# Patient Record
Sex: FEMALE | Race: ASIAN
[De-identification: names, ages, dates, MRNs, and addresses within clinical notes are randomized per-mention and may not be internally consistent; named-entity substitution may affect disease eponyms.]

---

## 2017-02-13 PROBLEM — E55.9 VITAMIN D DEFICIENCY: Status: ACTIVE | Noted: 2017-02-13

## 2017-02-13 PROBLEM — E11.9 TYPE 2 DIABETES MELLITUS WITHOUT COMPLICATION, WITHOUT LONG-TERM CURRENT USE OF INSULIN (HCC): Status: ACTIVE | Noted: 2017-02-13

## 2017-02-13 PROBLEM — M81.0 OSTEOPOROSIS: Status: ACTIVE | Noted: 2017-02-13

## 2017-02-13 PROCEDURE — 82043 UR ALBUMIN QUANTITATIVE: CPT | Performed by: INTERNAL MEDICINE

## 2017-02-13 PROCEDURE — 82570 ASSAY OF URINE CREATININE: CPT | Performed by: INTERNAL MEDICINE

## 2017-02-28 PROBLEM — E11.36 DIABETIC CATARACT (HCC): Status: ACTIVE | Noted: 2017-02-28

## 2017-02-28 PROBLEM — E11.9 TYPE 2 DIABETES MELLITUS WITHOUT COMPLICATION, WITHOUT LONG-TERM CURRENT USE OF INSULIN (HCC): Status: RESOLVED | Noted: 2017-02-13 | Resolved: 2017-02-28

## 2017-07-03 PROCEDURE — 81003 URINALYSIS AUTO W/O SCOPE: CPT | Performed by: INTERNAL MEDICINE

## 2018-03-31 PROBLEM — E11.36 DIABETIC CATARACT (HCC): Status: RESOLVED | Noted: 2017-02-28 | Resolved: 2018-03-31

## 2018-04-04 PROCEDURE — 82043 UR ALBUMIN QUANTITATIVE: CPT | Performed by: INTERNAL MEDICINE

## 2018-04-04 PROCEDURE — 82607 VITAMIN B-12: CPT | Performed by: INTERNAL MEDICINE

## 2018-04-04 PROCEDURE — 82570 ASSAY OF URINE CREATININE: CPT | Performed by: INTERNAL MEDICINE

## 2018-04-04 PROCEDURE — 81003 URINALYSIS AUTO W/O SCOPE: CPT | Performed by: INTERNAL MEDICINE

## 2018-04-04 PROCEDURE — 82746 ASSAY OF FOLIC ACID SERUM: CPT | Performed by: INTERNAL MEDICINE

## 2018-04-18 PROBLEM — M25.511 ACUTE PAIN OF RIGHT SHOULDER: Status: ACTIVE | Noted: 2018-04-18

## 2018-08-14 PROBLEM — R06.83 SNORING: Status: ACTIVE | Noted: 2018-08-14

## 2018-08-31 PROBLEM — G89.29 CHRONIC RIGHT SHOULDER PAIN: Status: ACTIVE | Noted: 2018-08-31

## 2018-08-31 PROBLEM — M25.511 CHRONIC RIGHT SHOULDER PAIN: Status: ACTIVE | Noted: 2018-08-31

## 2018-08-31 PROBLEM — G89.29 CHRONIC BILATERAL THORACIC BACK PAIN: Status: ACTIVE | Noted: 2018-08-31

## 2018-08-31 PROBLEM — M54.6 CHRONIC BILATERAL THORACIC BACK PAIN: Status: ACTIVE | Noted: 2018-08-31

## 2018-10-03 PROBLEM — I70.0 AORTIC ATHEROSCLEROSIS (HCC): Status: ACTIVE | Noted: 2018-10-03

## 2018-10-05 PROCEDURE — 84402 ASSAY OF FREE TESTOSTERONE: CPT | Performed by: INTERNAL MEDICINE

## 2018-10-05 PROCEDURE — 82043 UR ALBUMIN QUANTITATIVE: CPT | Performed by: INTERNAL MEDICINE

## 2018-10-05 PROCEDURE — 86376 MICROSOMAL ANTIBODY EACH: CPT | Performed by: INTERNAL MEDICINE

## 2018-10-05 PROCEDURE — 82679 ASSAY OF ESTRONE: CPT | Performed by: INTERNAL MEDICINE

## 2018-10-05 PROCEDURE — 84482 T3 REVERSE: CPT | Performed by: INTERNAL MEDICINE

## 2018-10-05 PROCEDURE — 82570 ASSAY OF URINE CREATININE: CPT | Performed by: INTERNAL MEDICINE

## 2018-10-05 PROCEDURE — 83090 ASSAY OF HOMOCYSTEINE: CPT | Performed by: INTERNAL MEDICINE

## 2018-10-05 PROCEDURE — 87086 URINE CULTURE/COLONY COUNT: CPT | Performed by: INTERNAL MEDICINE

## 2018-10-05 PROCEDURE — 81001 URINALYSIS AUTO W/SCOPE: CPT | Performed by: INTERNAL MEDICINE

## 2018-10-05 PROCEDURE — 84403 ASSAY OF TOTAL TESTOSTERONE: CPT | Performed by: INTERNAL MEDICINE

## 2019-03-11 PROBLEM — E11.319 TYPE 2 DIABETES MELLITUS WITH RETINOPATHY, WITHOUT LONG-TERM CURRENT USE OF INSULIN, MACULAR EDEMA PRESENCE UNSPECIFIED, UNSPECIFIED LATERALITY, UNSPECIFIED RETINOPATHY SEVERITY (HCC): Status: ACTIVE | Noted: 2017-02-13

## 2019-10-16 ENCOUNTER — HOSPITAL (OUTPATIENT)
Dept: OTHER | Age: 74
End: 2019-10-16

## 2019-10-16 ENCOUNTER — DIAGNOSTIC TRANS (OUTPATIENT)
Dept: OTHER | Age: 74
End: 2019-10-16

## 2019-10-16 LAB
ALBUMIN SERPL-MCNC: 3.8 G/DL (ref 3.6–5.1)
ALBUMIN/GLOB SERPL: 0.9 {RATIO} (ref 1–2.4)
ALP SERPL-CCNC: 67 UNITS/L (ref 45–117)
ALT SERPL-CCNC: 27 UNITS/L
ANALYZER ANC (IANC): NORMAL
ANION GAP SERPL CALC-SCNC: 9 MMOL/L (ref 10–20)
AST SERPL-CCNC: 26 UNITS/L
AST SERPL-CCNC: ABNORMAL U/L
BASOPHILS # BLD: 0.1 K/MCL (ref 0–0.3)
BASOPHILS NFR BLD: 1 %
BILIRUB SERPL-MCNC: 0.3 MG/DL (ref 0.2–1)
BUN SERPL-MCNC: 16 MG/DL (ref 6–20)
BUN/CREAT SERPL: 27 (ref 7–25)
CALCIUM SERPL-MCNC: 8.9 MG/DL (ref 8.4–10.2)
CHLORIDE SERPL-SCNC: 107 MMOL/L (ref 98–107)
CK SERPL-CCNC: 101 UNITS/L (ref 26–192)
CK SERPL-CCNC: NORMAL U/L
CO2 SERPL-SCNC: 24 MMOL/L (ref 21–32)
CREAT SERPL-MCNC: 0.59 MG/DL (ref 0.51–0.95)
DIFFERENTIAL METHOD BLD: NORMAL
EOSINOPHIL # BLD: 0.1 K/MCL (ref 0.1–0.5)
EOSINOPHIL NFR BLD: 1 %
ERYTHROCYTE [DISTWIDTH] IN BLOOD: 12.7 % (ref 11–15)
GLOBULIN SER-MCNC: 4.4 G/DL (ref 2–4)
GLUCOSE SERPL-MCNC: 161 MG/DL (ref 65–99)
HCT VFR BLD CALC: 38.6 % (ref 36–46.5)
HGB BLD-MCNC: 12.8 G/DL (ref 12–15.5)
IMM GRANULOCYTES # BLD AUTO: 0 K/MCL (ref 0–0.2)
IMM GRANULOCYTES NFR BLD: 1 %
LIPASE SERPL-CCNC: 146 UNITS/L (ref 73–393)
LYMPHOCYTES # BLD: 2.3 K/MCL (ref 1–4)
LYMPHOCYTES NFR BLD: 27 %
MCH RBC QN AUTO: 28.1 PG (ref 26–34)
MCHC RBC AUTO-ENTMCNC: 33.2 G/DL (ref 32–36.5)
MCV RBC AUTO: 84.6 FL (ref 78–100)
MONOCYTES # BLD: 0.6 K/MCL (ref 0.3–0.9)
MONOCYTES NFR BLD: 7 %
NEUTROPHILS # BLD: 5.2 K/MCL (ref 1.8–7.7)
NEUTROPHILS NFR BLD: 63 %
NEUTS SEG NFR BLD: NORMAL %
NRBC (NRBCRE): 0 /100 WBC
PLATELET # BLD: 305 K/MCL (ref 140–450)
POTASSIUM SERPL-SCNC: 4.1 MMOL/L (ref 3.4–5.1)
POTASSIUM SERPL-SCNC: ABNORMAL MMOL/L
PROT SERPL-MCNC: 8.2 G/DL (ref 6.4–8.2)
RBC # BLD: 4.56 MIL/MCL (ref 4–5.2)
SODIUM SERPL-SCNC: 136 MMOL/L (ref 135–145)
TROPONIN I SERPL HS-MCNC: <0.02 NG/ML
WBC # BLD: 8.3 K/MCL (ref 4.2–11)

## 2020-09-21 PROBLEM — G89.29 CHRONIC RIGHT SHOULDER PAIN: Status: RESOLVED | Noted: 2018-08-31 | Resolved: 2020-09-21

## 2020-09-21 PROBLEM — R06.83 SNORING: Status: RESOLVED | Noted: 2018-08-14 | Resolved: 2020-09-21

## 2020-09-21 PROBLEM — M25.511 CHRONIC RIGHT SHOULDER PAIN: Status: RESOLVED | Noted: 2018-08-31 | Resolved: 2020-09-21

## 2020-09-21 PROBLEM — G89.29 CHRONIC BILATERAL THORACIC BACK PAIN: Status: RESOLVED | Noted: 2018-08-31 | Resolved: 2020-09-21

## 2020-09-21 PROBLEM — M54.6 CHRONIC BILATERAL THORACIC BACK PAIN: Status: RESOLVED | Noted: 2018-08-31 | Resolved: 2020-09-21

## 2021-01-21 PROBLEM — E11.9 DM TYPE 2 WITHOUT RETINOPATHY (HCC): Status: ACTIVE | Noted: 2021-01-21

## 2021-01-21 PROBLEM — E11.319 TYPE 2 DIABETES MELLITUS WITH RETINOPATHY, WITHOUT LONG-TERM CURRENT USE OF INSULIN, MACULAR EDEMA PRESENCE UNSPECIFIED, UNSPECIFIED LATERALITY, UNSPECIFIED RETINOPATHY SEVERITY (HCC): Status: RESOLVED | Noted: 2017-02-13 | Resolved: 2021-01-21

## 2021-01-25 PROBLEM — M54.6 PAIN IN THORACIC SPINE: Status: ACTIVE | Noted: 2018-08-31

## 2021-01-28 PROBLEM — G89.29 CHRONIC BILATERAL THORACIC BACK PAIN: Status: ACTIVE | Noted: 2021-01-28

## 2021-01-28 PROBLEM — M54.6 CHRONIC BILATERAL THORACIC BACK PAIN: Status: ACTIVE | Noted: 2021-01-28

## 2021-03-15 DIAGNOSIS — Z23 NEED FOR VACCINATION: ICD-10-CM

## 2021-06-08 PROBLEM — E11.51 TYPE 2 DIABETES MELLITUS WITH DIABETIC PERIPHERAL ANGIOPATHY WITHOUT GANGRENE, WITHOUT LONG-TERM CURRENT USE OF INSULIN (HCC): Status: ACTIVE | Noted: 2021-06-08

## 2022-03-01 PROBLEM — D32.9 MENINGIOMA (HCC): Status: ACTIVE | Noted: 2022-03-01

## 2024-04-29 RX ORDER — IBUPROFEN 400 MG/1
1 TABLET ORAL 2 TIMES DAILY PRN
COMMUNITY
Start: 2023-08-08

## 2024-04-29 RX ORDER — ATORVASTATIN CALCIUM 40 MG/1
40 TABLET, FILM COATED ORAL DAILY
COMMUNITY
Start: 2024-04-23

## 2024-04-29 RX ORDER — NYSTATIN 100000 [USP'U]/G
1 POWDER TOPICAL 2 TIMES DAILY
COMMUNITY
Start: 2024-04-03

## 2024-04-29 RX ORDER — CARVEDILOL 3.12 MG/1
3.12 TABLET ORAL 2 TIMES DAILY
COMMUNITY
Start: 2024-04-03

## 2024-04-30 NOTE — H&P
Eliseo Swartz is a 78 year old female.   Patient presents for ventral hernia repair     HPI:       The patient presents for evaluation of abdominal hernia.   Signs and symptoms were first noticed over 2 years ago.  Symptoms include bulge and pain especially 10 days ago. Now soreness  Aggravating factors: none known - does not lift due to back pain     Associated symptoms: no change in bowel habits.      Prior treatment has included no prior abdominal surgeries x for tubal ligation     Prior imaging: CT abdomen/pelvis 3/27/2024: Moderate fat-containing ventral abdominal wall hernia. There is moderate fat stranding in the neck, which raises the possibility of incarceration. Notably, a segment of transverse colon closely abuts the neck of the hernia, but is not clearly involved at this time. No bowel obstruction.         Allergies:  No Known Allergies   Current Meds:         Current Outpatient Medications   Medication Sig Dispense Refill    carvedilol (COREG) 3.125 MG Oral Tab Take 1 tablet (3.125 mg total) by mouth 2 (two) times daily. 180 tablet 0    empagliflozin (JARDIANCE) 10 MG Oral Tab Take 1 tablet (10 mg total) by mouth daily. 90 tablet 1    fluticasone propionate 50 MCG/ACT Nasal Suspension 2 sprays by Nasal route daily. 48 g 2    fluconazole 150 MG Oral Tab Take one tablet by mouth once and repeat 72 hours later (Patient not taking: Reported on 2/20/2024) 2 tablet 0    ketoconazole 2 % External Cream Apply under breasts twice daily 45 g 3    ketoconazole 2 % External Cream Apply to affected area under the breasts twice daily 15 g 3    baclofen 5 MG Oral Tab Take 1 tablet (5 mg total) by mouth 2 (two) times daily as needed. 180 tablet 0    gabapentin 100 MG Oral Cap Take 1 capsule (100 mg total) by mouth daily as needed. 90 capsule 3    Meloxicam 7.5 MG Oral Tab Take 1 tablet (7.5 mg total) by mouth daily. 90 tablet 3    traZODone 50 MG Oral Tab Take 1 tablet (50 mg total) by mouth nightly as needed  for Sleep. 30 tablet 1    Blood Glucose Monitoring Suppl (ACCU-CHEK GUIDE) w/Device Does not apply Kit TEST BLOOD GLUCOSE EVERY DAY 1 kit 0    Accu-Chek Softclix Lancets Does not apply Misc TEST BLOOD GLUCOSE EVERY  each 3    Glucose Blood (ACCU-CHEK GUIDE) In Vitro Strip TEST BLOOD GLUCOSE EVERY  strip 3    Alcohol Swabs (BD SWAB SINGLE USE REGULAR) Does not apply Pads TEST BLOOD GLUCOSE EVERY  each 3    ibuprofen (IBU) 400 MG Oral Tab Take 1 tablet (400 mg total) by mouth 2 (two) times daily as needed for Pain. 90 tablet 0    omeprazole 20 MG Oral Capsule Delayed Release Take 1 capsule (20 mg total) by mouth every morning. Before meal 90 capsule 3    atorvastatin 10 MG Oral Tab Take 1 tablet (10 mg total) by mouth daily. 90 tablet 3    amLODIPine 2.5 MG Oral Tab Take 1 tablet (2.5 mg total) by mouth 2 (two) times daily. 180 tablet 3    levothyroxine 75 MCG Oral Tab Take 1 tablet (75 mcg total) by mouth daily. 90 tablet 3    metFORMIN 500 MG Oral Tab Take 1 tablet (500 mg total) by mouth daily. 90 tablet 3    losartan 50 MG Oral Tab Take 1 tablet (50 mg total) by mouth 2 (two) times daily. 180 tablet 3    ACCU-CHEK ZARINA PLUS In Vitro Strip TEST BLOOD GLUCOSE EVERY  strip 1    Alcohol Swabs (BD SWAB SINGLE USE REGULAR) Does not apply Pads Test daily 100 each 3    Blood Glucose Monitoring Suppl (ACCU-CHEK ZARINA PLUS) w/Device Does not apply Kit          Accu-Chek FastClix Lancets Does not apply Misc Check sugars daily 100 each 3    Blood Glucose Monitoring Suppl (ACCU-CHEK ZARINA) Does not apply Device 1 Device by Does not apply route daily. 1 Device 0    Melatonin 2.5 MG Oral Cap Take by mouth.        Cholecalciferol (VITAMIN D3) 5000 units Oral Tab Take 1 tablet by mouth as needed.        multivitamin Oral Tab Take 1 tablet by mouth daily.             HISTORY:       Past Medical History:   Diagnosis Date    Borderline diabetes       Dx around age 60s    Chronic bilateral thoracic back  pain 2018     Resolved last addressed  10/17/19    Chronic right shoulder pain 2018     Resolved last addressed  18    DJD (degenerative joint disease), thoracic 10/05/2019     CT spine mod djd, osteopenia, ? Lesion at T10    Enlarged thoracic aorta (HCC)      GERD (gastroesophageal reflux disease)      Hernia of abdominal wall      Hyperlipidemia LDL goal <100      Hypertension, essential, benign      Hypothyroidism       Dx at age 50s    Osteoporosis      Pancreatic pseudocyst       us last year, mri at [facility]    Pituitary abnormality (HCC)       Pt says she was told she had pituitary abnormality ?empty sella?    Rhinitis 2014     Resolved last addressed  14    Snoring 2018     Resolved last addressed  18            Past Surgical History:   Procedure Laterality Date    COLONOSCOPY   ?    COLONOSCOPY   2017             x 3            Family History   Problem Relation Age of Onset    Diabetes Father           Type 2 DM on insulin    Heart Attack Father      Diabetes Sister           One sister with DM    Diabetes Brother           One brother with DM    Breast Cancer Neg      Cancer Neg      Thyroid disease Neg      Thyroid Disorder Neg        Social History    Tobacco Use      Smoking status: Never      Smokeless tobacco: Never    Alcohol use: No    Drug use: No  Retired lab worker         ROS:   HEENT: denies nasal congestion, sinus pain or sore throat  RESPIRATORY: denies shortness of breath, wheezing or cough   CARDIOVASCULAR: denies chest pain or VILCHIS; no palpitations   GI: denies nausea, vomiting, constipation, diarrhea; no rectal bleeding;   colonoscopy done within 10 years.  GENITAL/: dysuria - denies; nocturia - 1-2 times per night  MUSCULOSKELETAL: no joint complaints upper or lower extremities  NEURO: no sensory or motor complaint  PSYCHE: no symptoms of depression or anxiety  HEMATOLOGY: denies bruising or excessive bleeding; anticoagulants:  none  ENDOCRINE: denies excessive thirst or urination; denies unexpected wt gain or wt loss     PHYSICAL EXAM:   GENERAL: well developed, in no distress  SKIN: no rashes, no suspicious lesions  HEENT: PERRLA, EOMI, anicteric; no JVD   RESPIRATORY: CTA, no crackles  CARDIOVASCULAR: normal S1, S2, RRR; no murmur  ABDOMEN: soft, nondistended, nontender; large and chronically incarcerated periumbilical ventral hernia present; no evidence of left inguinal hernia and right inguinal hernia  GENITAL/: normal external genitalia  LYMPHATIC: no lymphadenopathy  EXTREMITIES: no cyanosis, clubbing or edema, peripheral pulses intact  NEUROLOGIC: intact; no sensorimotor deficit; reflexes normal  Physical Exam  Abdominal:               ASSESSMENT/ PLAN:      Symptomatic Chronically Incarcerated Ventral Hernia     The patient opted for open ventral hernia repair with mesh. The patient was informed in detail the details of the procedure, the alternatives including robotic or laparoscopic repair, and the potential risks and complications including bleeding, infection, hematoma, seroma, recurrence, numbness, pain, reoperation with possible explantation of mesh, DVT/PE, etc. The patient consented to the procedure.    The above referenced H&P was reviewed by Fam Williamson MD on 4/30/2024, the patient was examined and no significant changes have occurred in the patient's condition since the H&P was performed.  Risks and benefits were discussed, proceed with procedure as planned.    Fam Williamson MD [facility]  04/30/24  4:32 PM

## 2024-05-01 ENCOUNTER — ANESTHESIA (OUTPATIENT)
Dept: SURGERY | Facility: HOSPITAL | Age: 79
End: 2024-05-01
Payer: MEDICARE

## 2024-05-01 ENCOUNTER — HOSPITAL ENCOUNTER (OUTPATIENT)
Facility: HOSPITAL | Age: 79
Discharge: HOME | End: 2024-05-02
Attending: SURGERY | Admitting: SURGERY
Payer: MEDICARE

## 2024-05-01 ENCOUNTER — ANESTHESIA EVENT (OUTPATIENT)
Dept: SURGERY | Facility: HOSPITAL | Age: 79
End: 2024-05-01
Payer: MEDICARE

## 2024-05-01 DIAGNOSIS — K43.6 VENTRAL HERNIA WITH OBSTRUCTION AND WITHOUT GANGRENE: Primary | ICD-10-CM

## 2024-05-01 LAB
GLUCOSE BLDC GLUCOMTR-MCNC: 136 MG/DL (ref 70–99)
GLUCOSE BLDC GLUCOMTR-MCNC: 139 MG/DL (ref 70–99)
GLUCOSE BLDC GLUCOMTR-MCNC: 168 MG/DL (ref 70–99)
GLUCOSE BLDC GLUCOMTR-MCNC: 192 MG/DL (ref 70–99)
GLUCOSE BLDC GLUCOMTR-MCNC: 196 MG/DL (ref 70–99)
GLUCOSE BLDC GLUCOMTR-MCNC: 203 MG/DL (ref 70–99)

## 2024-05-01 PROCEDURE — 82962 GLUCOSE BLOOD TEST: CPT

## 2024-05-01 PROCEDURE — 0WUF0JZ SUPPLEMENT ABDOMINAL WALL WITH SYNTHETIC SUBSTITUTE, OPEN APPROACH: ICD-10-PCS | Performed by: SURGERY

## 2024-05-01 DEVICE — VENTRIO ST HERNIA PATCH
Type: IMPLANTABLE DEVICE | Site: ABDOMEN | Status: FUNCTIONAL
Brand: VENTRIO ST HERNIA PATCH

## 2024-05-01 RX ORDER — ACETAMINOPHEN 325 MG/1
650 TABLET ORAL EVERY 6 HOURS PRN
Status: DISCONTINUED | OUTPATIENT
Start: 2024-05-01 | End: 2024-05-02

## 2024-05-01 RX ORDER — BISACODYL 10 MG
10 SUPPOSITORY, RECTAL RECTAL
Status: DISCONTINUED | OUTPATIENT
Start: 2024-05-01 | End: 2024-05-02

## 2024-05-01 RX ORDER — SODIUM CHLORIDE 9 MG/ML
INJECTION, SOLUTION INTRAVENOUS CONTINUOUS PRN
Status: DISCONTINUED | OUTPATIENT
Start: 2024-05-01 | End: 2024-05-01 | Stop reason: SURG

## 2024-05-01 RX ORDER — LEVOTHYROXINE SODIUM 0.07 MG/1
75 TABLET ORAL DAILY
Status: DISCONTINUED | OUTPATIENT
Start: 2024-05-02 | End: 2024-05-02

## 2024-05-01 RX ORDER — HEPARIN SODIUM 5000 [USP'U]/ML
5000 INJECTION, SOLUTION INTRAVENOUS; SUBCUTANEOUS EVERY 8 HOURS SCHEDULED
Status: DISCONTINUED | OUTPATIENT
Start: 2024-05-01 | End: 2024-05-02

## 2024-05-01 RX ORDER — ATORVASTATIN CALCIUM 40 MG/1
40 TABLET, FILM COATED ORAL DAILY
Status: DISCONTINUED | OUTPATIENT
Start: 2024-05-02 | End: 2024-05-02

## 2024-05-01 RX ORDER — MORPHINE SULFATE 4 MG/ML
2 INJECTION, SOLUTION INTRAMUSCULAR; INTRAVENOUS EVERY 10 MIN PRN
Status: DISCONTINUED | OUTPATIENT
Start: 2024-05-01 | End: 2024-05-01 | Stop reason: HOSPADM

## 2024-05-01 RX ORDER — PANTOPRAZOLE SODIUM 20 MG/1
20 TABLET, DELAYED RELEASE ORAL
Status: DISCONTINUED | OUTPATIENT
Start: 2024-05-02 | End: 2024-05-02

## 2024-05-01 RX ORDER — DEXTROSE MONOHYDRATE 25 G/50ML
50 INJECTION, SOLUTION INTRAVENOUS
Status: DISCONTINUED | OUTPATIENT
Start: 2024-05-01 | End: 2024-05-02

## 2024-05-01 RX ORDER — NICOTINE POLACRILEX 4 MG
15 LOZENGE BUCCAL
Status: DISCONTINUED | OUTPATIENT
Start: 2024-05-01 | End: 2024-05-02

## 2024-05-01 RX ORDER — ACETAMINOPHEN 500 MG
1000 TABLET ORAL ONCE AS NEEDED
Status: DISCONTINUED | OUTPATIENT
Start: 2024-05-01 | End: 2024-05-01 | Stop reason: HOSPADM

## 2024-05-01 RX ORDER — SODIUM CHLORIDE 9 MG/ML
INJECTION, SOLUTION INTRAVENOUS CONTINUOUS
Status: DISCONTINUED | OUTPATIENT
Start: 2024-05-01 | End: 2024-05-01

## 2024-05-01 RX ORDER — IBUPROFEN 200 MG
200 TABLET ORAL EVERY 4 HOURS PRN
Status: DISCONTINUED | OUTPATIENT
Start: 2024-05-01 | End: 2024-05-02

## 2024-05-01 RX ORDER — SODIUM CHLORIDE 9 MG/ML
INJECTION, SOLUTION INTRAMUSCULAR; INTRAVENOUS; SUBCUTANEOUS AS NEEDED
Status: DISCONTINUED | OUTPATIENT
Start: 2024-05-01 | End: 2024-05-01 | Stop reason: HOSPADM

## 2024-05-01 RX ORDER — LIDOCAINE HYDROCHLORIDE 10 MG/ML
INJECTION, SOLUTION EPIDURAL; INFILTRATION; INTRACAUDAL; PERINEURAL AS NEEDED
Status: DISCONTINUED | OUTPATIENT
Start: 2024-05-01 | End: 2024-05-01 | Stop reason: SURG

## 2024-05-01 RX ORDER — CEFAZOLIN SODIUM/WATER 2 G/20 ML
2 SYRINGE (ML) INTRAVENOUS ONCE
Status: COMPLETED | OUTPATIENT
Start: 2024-05-01 | End: 2024-05-01

## 2024-05-01 RX ORDER — CEFAZOLIN SODIUM 1 G/3ML
INJECTION, POWDER, FOR SOLUTION INTRAMUSCULAR; INTRAVENOUS AS NEEDED
Status: DISCONTINUED | OUTPATIENT
Start: 2024-05-01 | End: 2024-05-01 | Stop reason: HOSPADM

## 2024-05-01 RX ORDER — HYDROMORPHONE HYDROCHLORIDE 1 MG/ML
0.6 INJECTION, SOLUTION INTRAMUSCULAR; INTRAVENOUS; SUBCUTANEOUS EVERY 5 MIN PRN
Status: DISCONTINUED | OUTPATIENT
Start: 2024-05-01 | End: 2024-05-01 | Stop reason: HOSPADM

## 2024-05-01 RX ORDER — ROCURONIUM BROMIDE 10 MG/ML
INJECTION, SOLUTION INTRAVENOUS AS NEEDED
Status: DISCONTINUED | OUTPATIENT
Start: 2024-05-01 | End: 2024-05-01 | Stop reason: SURG

## 2024-05-01 RX ORDER — SODIUM CHLORIDE, SODIUM LACTATE, POTASSIUM CHLORIDE, CALCIUM CHLORIDE 600; 310; 30; 20 MG/100ML; MG/100ML; MG/100ML; MG/100ML
INJECTION, SOLUTION INTRAVENOUS CONTINUOUS
Status: DISCONTINUED | OUTPATIENT
Start: 2024-05-01 | End: 2024-05-01 | Stop reason: ALTCHOICE

## 2024-05-01 RX ORDER — CARVEDILOL 3.12 MG/1
3.12 TABLET ORAL 2 TIMES DAILY
Status: DISCONTINUED | OUTPATIENT
Start: 2024-05-01 | End: 2024-05-02

## 2024-05-01 RX ORDER — METOCLOPRAMIDE HYDROCHLORIDE 5 MG/ML
10 INJECTION INTRAMUSCULAR; INTRAVENOUS EVERY 8 HOURS PRN
Status: DISCONTINUED | OUTPATIENT
Start: 2024-05-01 | End: 2024-05-01 | Stop reason: HOSPADM

## 2024-05-01 RX ORDER — ONDANSETRON 2 MG/ML
4 INJECTION INTRAMUSCULAR; INTRAVENOUS EVERY 6 HOURS PRN
Status: DISCONTINUED | OUTPATIENT
Start: 2024-05-01 | End: 2024-05-01 | Stop reason: HOSPADM

## 2024-05-01 RX ORDER — NICOTINE POLACRILEX 4 MG
30 LOZENGE BUCCAL
Status: DISCONTINUED | OUTPATIENT
Start: 2024-05-01 | End: 2024-05-02

## 2024-05-01 RX ORDER — DOCUSATE SODIUM 100 MG/1
100 CAPSULE, LIQUID FILLED ORAL 2 TIMES DAILY
Qty: 14 CAPSULE | Refills: 0 | Status: SHIPPED | OUTPATIENT
Start: 2024-05-01 | End: 2024-05-08

## 2024-05-01 RX ORDER — DEXTROSE MONOHYDRATE 25 G/50ML
50 INJECTION, SOLUTION INTRAVENOUS
Status: DISCONTINUED | OUTPATIENT
Start: 2024-05-01 | End: 2024-05-01 | Stop reason: HOSPADM

## 2024-05-01 RX ORDER — IBUPROFEN 400 MG/1
400 TABLET ORAL EVERY 4 HOURS PRN
Status: DISCONTINUED | OUTPATIENT
Start: 2024-05-01 | End: 2024-05-02

## 2024-05-01 RX ORDER — NICOTINE POLACRILEX 4 MG
30 LOZENGE BUCCAL
Status: DISCONTINUED | OUTPATIENT
Start: 2024-05-01 | End: 2024-05-01 | Stop reason: HOSPADM

## 2024-05-01 RX ORDER — SENNOSIDES 8.6 MG
17.2 TABLET ORAL NIGHTLY PRN
Status: DISCONTINUED | OUTPATIENT
Start: 2024-05-01 | End: 2024-05-02

## 2024-05-01 RX ORDER — HYDROMORPHONE HYDROCHLORIDE 1 MG/ML
0.4 INJECTION, SOLUTION INTRAMUSCULAR; INTRAVENOUS; SUBCUTANEOUS EVERY 2 HOUR PRN
Status: DISCONTINUED | OUTPATIENT
Start: 2024-05-01 | End: 2024-05-02

## 2024-05-01 RX ORDER — POLYETHYLENE GLYCOL 3350 17 G/17G
17 POWDER, FOR SOLUTION ORAL DAILY PRN
Status: DISCONTINUED | OUTPATIENT
Start: 2024-05-01 | End: 2024-05-02

## 2024-05-01 RX ORDER — TRAMADOL HYDROCHLORIDE 50 MG/1
50 TABLET ORAL EVERY 6 HOURS PRN
Qty: 15 TABLET | Refills: 0 | Status: SHIPPED | OUTPATIENT
Start: 2024-05-01

## 2024-05-01 RX ORDER — METOCLOPRAMIDE HYDROCHLORIDE 5 MG/ML
10 INJECTION INTRAMUSCULAR; INTRAVENOUS EVERY 8 HOURS PRN
Status: DISCONTINUED | OUTPATIENT
Start: 2024-05-01 | End: 2024-05-02

## 2024-05-01 RX ORDER — BUPIVACAINE HYDROCHLORIDE AND EPINEPHRINE 2.5; 5 MG/ML; UG/ML
INJECTION, SOLUTION INFILTRATION; PERINEURAL AS NEEDED
Status: DISCONTINUED | OUTPATIENT
Start: 2024-05-01 | End: 2024-05-01 | Stop reason: HOSPADM

## 2024-05-01 RX ORDER — IBUPROFEN 600 MG/1
600 TABLET ORAL EVERY 4 HOURS PRN
Status: DISCONTINUED | OUTPATIENT
Start: 2024-05-01 | End: 2024-05-02

## 2024-05-01 RX ORDER — MORPHINE SULFATE 4 MG/ML
4 INJECTION, SOLUTION INTRAMUSCULAR; INTRAVENOUS EVERY 10 MIN PRN
Status: DISCONTINUED | OUTPATIENT
Start: 2024-05-01 | End: 2024-05-01 | Stop reason: HOSPADM

## 2024-05-01 RX ORDER — ONDANSETRON 2 MG/ML
4 INJECTION INTRAMUSCULAR; INTRAVENOUS EVERY 6 HOURS PRN
Status: DISCONTINUED | OUTPATIENT
Start: 2024-05-01 | End: 2024-05-02

## 2024-05-01 RX ORDER — NICOTINE POLACRILEX 4 MG
15 LOZENGE BUCCAL
Status: DISCONTINUED | OUTPATIENT
Start: 2024-05-01 | End: 2024-05-01 | Stop reason: HOSPADM

## 2024-05-01 RX ORDER — ONDANSETRON 2 MG/ML
INJECTION INTRAMUSCULAR; INTRAVENOUS AS NEEDED
Status: DISCONTINUED | OUTPATIENT
Start: 2024-05-01 | End: 2024-05-01 | Stop reason: SURG

## 2024-05-01 RX ORDER — HYDROMORPHONE HYDROCHLORIDE 1 MG/ML
0.2 INJECTION, SOLUTION INTRAMUSCULAR; INTRAVENOUS; SUBCUTANEOUS EVERY 5 MIN PRN
Status: DISCONTINUED | OUTPATIENT
Start: 2024-05-01 | End: 2024-05-01 | Stop reason: HOSPADM

## 2024-05-01 RX ORDER — ACETAMINOPHEN 500 MG
1000 TABLET ORAL EVERY 8 HOURS
Status: DISCONTINUED | OUTPATIENT
Start: 2024-05-01 | End: 2024-05-02

## 2024-05-01 RX ORDER — HYDROMORPHONE HYDROCHLORIDE 1 MG/ML
0.8 INJECTION, SOLUTION INTRAMUSCULAR; INTRAVENOUS; SUBCUTANEOUS EVERY 2 HOUR PRN
Status: DISCONTINUED | OUTPATIENT
Start: 2024-05-01 | End: 2024-05-02

## 2024-05-01 RX ORDER — ENEMA 19; 7 G/133ML; G/133ML
1 ENEMA RECTAL ONCE AS NEEDED
Status: DISCONTINUED | OUTPATIENT
Start: 2024-05-01 | End: 2024-05-02

## 2024-05-01 RX ORDER — HYDROMORPHONE HYDROCHLORIDE 1 MG/ML
0.2 INJECTION, SOLUTION INTRAMUSCULAR; INTRAVENOUS; SUBCUTANEOUS EVERY 2 HOUR PRN
Status: DISCONTINUED | OUTPATIENT
Start: 2024-05-01 | End: 2024-05-02

## 2024-05-01 RX ORDER — HYDROMORPHONE HYDROCHLORIDE 1 MG/ML
0.4 INJECTION, SOLUTION INTRAMUSCULAR; INTRAVENOUS; SUBCUTANEOUS EVERY 5 MIN PRN
Status: DISCONTINUED | OUTPATIENT
Start: 2024-05-01 | End: 2024-05-01 | Stop reason: HOSPADM

## 2024-05-01 RX ORDER — ACETAMINOPHEN 500 MG
1000 TABLET ORAL ONCE
Status: COMPLETED | OUTPATIENT
Start: 2024-05-01 | End: 2024-05-01

## 2024-05-01 RX ORDER — TRAMADOL HYDROCHLORIDE 50 MG/1
50 TABLET ORAL EVERY 6 HOURS PRN
Status: DISCONTINUED | OUTPATIENT
Start: 2024-05-01 | End: 2024-05-02

## 2024-05-01 RX ORDER — PHENYLEPHRINE HCL 10 MG/ML
VIAL (ML) INJECTION AS NEEDED
Status: DISCONTINUED | OUTPATIENT
Start: 2024-05-01 | End: 2024-05-01 | Stop reason: SURG

## 2024-05-01 RX ORDER — NALOXONE HYDROCHLORIDE 0.4 MG/ML
80 INJECTION, SOLUTION INTRAMUSCULAR; INTRAVENOUS; SUBCUTANEOUS AS NEEDED
Status: DISCONTINUED | OUTPATIENT
Start: 2024-05-01 | End: 2024-05-01 | Stop reason: HOSPADM

## 2024-05-01 RX ORDER — LOSARTAN POTASSIUM 50 MG/1
50 TABLET ORAL NIGHTLY
Status: DISCONTINUED | OUTPATIENT
Start: 2024-05-01 | End: 2024-05-02

## 2024-05-01 RX ORDER — MAGNESIUM OXIDE 400 MG (241.3 MG MAGNESIUM) TABLET
2.5 TABLET NIGHTLY
Status: DISCONTINUED | OUTPATIENT
Start: 2024-05-01 | End: 2024-05-02

## 2024-05-01 RX ORDER — CEFAZOLIN SODIUM/WATER 2 G/20 ML
2 SYRINGE (ML) INTRAVENOUS EVERY 8 HOURS
Qty: 40 ML | Refills: 0 | Status: COMPLETED | OUTPATIENT
Start: 2024-05-01 | End: 2024-05-02

## 2024-05-01 RX ORDER — SODIUM CHLORIDE, SODIUM LACTATE, POTASSIUM CHLORIDE, CALCIUM CHLORIDE 600; 310; 30; 20 MG/100ML; MG/100ML; MG/100ML; MG/100ML
INJECTION, SOLUTION INTRAVENOUS CONTINUOUS
Status: DISCONTINUED | OUTPATIENT
Start: 2024-05-01 | End: 2024-05-01 | Stop reason: HOSPADM

## 2024-05-01 RX ORDER — MORPHINE SULFATE 10 MG/ML
6 INJECTION, SOLUTION INTRAMUSCULAR; INTRAVENOUS EVERY 10 MIN PRN
Status: DISCONTINUED | OUTPATIENT
Start: 2024-05-01 | End: 2024-05-01 | Stop reason: HOSPADM

## 2024-05-01 RX ORDER — AMLODIPINE BESYLATE 2.5 MG/1
2.5 TABLET ORAL NIGHTLY
Status: DISCONTINUED | OUTPATIENT
Start: 2024-05-01 | End: 2024-05-02

## 2024-05-01 RX ORDER — DEXAMETHASONE SODIUM PHOSPHATE 4 MG/ML
VIAL (ML) INJECTION AS NEEDED
Status: DISCONTINUED | OUTPATIENT
Start: 2024-05-01 | End: 2024-05-01 | Stop reason: SURG

## 2024-05-01 RX ADMIN — ROCURONIUM BROMIDE 35 MG: 10 INJECTION, SOLUTION INTRAVENOUS at 08:52:00

## 2024-05-01 RX ADMIN — CEFAZOLIN SODIUM/WATER 2 G: 2 G/20 ML SYRINGE (ML) INTRAVENOUS at 08:49:00

## 2024-05-01 RX ADMIN — LIDOCAINE HYDROCHLORIDE 50 MG: 10 INJECTION, SOLUTION EPIDURAL; INFILTRATION; INTRACAUDAL; PERINEURAL at 08:44:00

## 2024-05-01 RX ADMIN — ONDANSETRON 4 MG: 2 INJECTION INTRAMUSCULAR; INTRAVENOUS at 08:44:00

## 2024-05-01 RX ADMIN — SODIUM CHLORIDE: 9 INJECTION, SOLUTION INTRAVENOUS at 08:44:00

## 2024-05-01 RX ADMIN — DEXAMETHASONE SODIUM PHOSPHATE 4 MG: 4 MG/ML VIAL (ML) INJECTION at 08:44:00

## 2024-05-01 RX ADMIN — PHENYLEPHRINE HCL 100 MCG: 10 MG/ML VIAL (ML) INJECTION at 08:55:00

## 2024-05-01 NOTE — OPERATIVE REPORT
[facility] OPERATING ROOM OPERATIVE REPORT:     PATIENT NAME: Eliseo Swartz  : 7/15/1945   MRN: [id number]    DATE OF OPERATION:   24    PREOPERATIVE DIAGNOSIS:  Ventral Hernia     POSTOPERATIVE DIAGNOSIS: Incarcerated Ventral Hernia     PROCEDURE PERFORMED:  Incarcerated Ventral Hernia Repair with implantation of 11.4 cm Ventrio ST mesh, posterior component separation    SURGEON:  Fam Williamson MD    ASST: Robert Fagan PA-C (Assistant helped position patient and helped with positioning, intraoperative retraction, suturing, wound closure, etc)    ANESTHESIA: General anesthesia     ESTIMATED BLOOD LOSS:   50 ml     The patient and her son understood the indications, details, potential risks and complications of the procedure including bleeding, infection, hematoma, seroma, recurrence, numbness, pain, cosmetic deformity, possible need for repeat operation, possible need for explantation of mesh, etc. The patient and her son consented to the procedure    The patient was brought to the operating room and was induced under anesthesia. The abdomen was prepped and draped in the usual sterile fashion. Local anesthesia in the form of 0.25% Marcaine with epinephrine was injected in the form of a field block.  A supraumbilical semicircular incision extended on the right side was made and dissection was carried down to the hernia sac which was delineated from the subcutaneous tissue.  The fascia was delineated and the hernia sac was mobilized from the fascial edges and the preperitoneum was dissected. The main hernia was just inferior and to the right of the umbilical stalk and contained incarcerated omentum which was difficult to reduce. There were other smaller defects just superior to the umbilicus. Posterior component separation was done on both sides by dividing the posterior rectus sheath longitudinally and  the rectus muscle to the lateral sheath. The posterior rectus sheaths were  sutured with 0 Prolene to completely exclude the abdominal contents. The total fascial defect measured approximately 6 cm in maximal diameter. A 11.4 cm Ventrio ST mesh was presoaked in antibiotic irrigation solution and placed in the preperitoneal space in an underlay position in relation to the fascia.  It was secured to the fascia with 0-Prolene U sutures.  Then 0-Prolene suture was used to close the fascial edges and more local anesthetic was infiltrated above and below the fascia.  2-0 Vicryl continuous suture was used to close the subcutaneous tissue and 4-0 Vicryl subcuticular suture was used to close the skin. Steri-Strips and sterile Tegaderm dressing was placed. The patient tolerated the procedure well and went to recovery room in stable condition.    Fam Williamson MD

## 2024-05-01 NOTE — DISCHARGE INSTRUCTIONS
Dr. Williamson Open Umbilical/Ventral Hernia Repair  Diet:    Eat light foods tonight and resume normal diet tomorrow.  If you develop nausea, stick to liquids until the nausea goes away.      Activity:    Rest today, avoid heavy lifting greater than 15 lbs, avoid bending or straining for 6 weeks.   No driving for 5 days and until you have stopped taking prescription pain medications.  If you had an open inguinal hernia repair it may be beneficial to wear compression underwear day and night to reduce pain, swelling, and \"fullness\", in the groin.   Bruising in and around the hernia repair is normal after surgery and will resolve over time.      Medications:    You may restart taking  , tonight and all your previous medications tonight at your regular time and dose unless instructed otherwise.      To manage pain you may use the following as a guideline:  Ice 2-3 times a day for 20-30 minute periods.  Tylenol - you may take extra strength Tylenol up to 1000 mg every 8 hours.  Do not exceed 4000 mg of Tylenol in a 24-hour period.  Advil - you may take 2 pills every 6-8 hours with food.  Prescription pain medication - only use for severe breakthrough pain    Please note, prescription pain medication can make you nauseated, bloated and constipated.  A stool softener will be sent to your pharmacy to help avoid constipation.  Examples of severe pain include, unable to sleep due to pain, or waking up in the middle of the night due to pain. Take it with food and discontinue if side effects occur. No alcohol or driving while taking prescription pain medications.     Do not take Aleve or Advil if allergic to them or if allergic to aspirin.      Dressing:   Place ice pack to operative site 3 times a day for the first 48 hours.   You may shower tomorrow with the operative site away from the shower head. Do not submerge your wounds in water (i.e. no baths, swimming, or water aerobics) for 4 weeks.  The tape bandage may be removed  in 3-5 days and leave the Steri Strip \"butterfly\" tapes intact until your office visit. You may continue to shower after that.    All incisions become somewhat hard and sometimes lumpy. That is part of the normal healing process. Bruising around the incisions or lower is also normal and should resolve with time.     Low grade fever up to 101F is normal after surgery. Severe swelling, fever > 101.5, redness or drainage from wound should be reported to your surgeon. Call the office number during and after hours if needed.      Follow-up:        Call the office at [phone].  Make appointment to see Dr. Williamson in approximately 2 weeks.     HOME INSTRUCTIONS  [facility] HOME CARE INSTRUCTIONS: POST-OP ANESTHESIA  The medication that you received for sedation or general anesthesia can last up to 24 hours. Your judgment and reflexes may be altered, even if you feel like your normal self.      We Recommend:   Do not drive any motor vehicle or bicycle   Avoid mowing the lawn, playing sports, or working with power tools/applicances (power saws, electric knives or mixers)   That you have someone stay with you on your first night home   Do not drink alcohol or take sleeping pills or tranquilizers   Do not sign legal documents within 24 hours of your procedure   If you had a nerve block for your surgery, take extra care not to put any pressure on your arm or hand for 24 hours    It is normal:  For you to have a sore throat if you had a breathing tube during surgery (while you were asleep!). The sore throat should get better within 48 hours. You can gargle with warm salt water (1/2 tsp in 4 oz warm water) or use a throat lozenge for comfort  To feel muscle aches or soreness especially in the abdomen, chest or neck. The achy feeling should go away in the next 24 hours  To feel weak, sleepy or \"wiped out\". Your should start feeling better in the next 24 hours.   To experience mild discomforts such as sore lip or tongue,  headache, cramps, gas pains or a bloated feeling in your abdomen.   To experience mild back pain or soreness for a day or two if you had spinal or epidural anesthesia.   If you had laparoscopic surgery, to feel shoulder pain or discomfort on the day of surgery.   For some patients to have nausea after surgery/anesthesia    If you feel nausea or experience vomiting:   Try to move around less.   Eat less than usual or drink only liquids until the next morning   Nausea should resolve in about 24 hours    If you have a problem when you are at home:    Call your surgeons office

## 2024-05-01 NOTE — ANESTHESIA POSTPROCEDURE EVALUATION
Patient: Eliseo Swartz    Procedure Summary       Date: 05/01/24 Room / Location: [facility] MAIN OR 02 / [facility] MAIN OR    Anesthesia Start: 0840 Anesthesia Stop: 1029    Procedure: Ventral hernia repair with mesh, bilateral posterior component seperation (Abdomen) Diagnosis: (Ventral hernia)    Surgeons: Fam Williamson MD Anesthesiologist: Caridad Ahmadi MD    Anesthesia Type: general ASA Status: 3            Anesthesia Type: general    Vitals Value Taken Time   /84 05/01/24 1029   Temp 97.7 °F (36.5 °C) 05/01/24 1028   Pulse 60 05/01/24 1029   Resp 18 05/01/24 1029   SpO2 97 % 05/01/24 1029   Vitals shown include unfiled device data.    [facility] AN Post Evaluation:   Patient Evaluated in PACU  Patient Participation: complete - patient participated  Level of Consciousness: awake  Pain Score: 0  Pain Management: adequate  Airway Patency:patent  Dental exam unchanged from preop  Yes    Cardiovascular Status: acceptable  Respiratory Status: acceptable  Postoperative Hydration acceptable      CARIDAD AHMADI MD  5/1/2024 10:29 AM

## 2024-05-01 NOTE — ANESTHESIA PREPROCEDURE EVALUATION
Anesthesia PreOp Note    HPI:     Eliseo Swartz is a 78 year old female who presents for preoperative consultation requested by: Fam Williamson MD    Date of Surgery: 5/1/2024    Procedure(s):  Ventral hernia repair with mesh  Indication: Ventral hernia    Relevant Problems   No relevant active problems       NPO:                         History Review:  Patient Active Problem List    Diagnosis Date Noted   • Meningioma (HCC) 03/01/2022   • Type 2 diabetes mellitus with diabetic peripheral angiopathy without gangrene, without long-term current use of insulin (HCC) 06/08/2021   • Chronic bilateral thoracic back pain 01/28/2021   • Aortic atherosclerosis (HCC) 10/03/2018   • Pain in thoracic spine 08/31/2018   • Vitamin D deficiency 02/13/2017   • Osteoporosis 02/13/2017   • Hyperlipidemia LDL goal <100    • Hypertension, essential, benign    • Hypothyroidism    • OAB (overactive bladder) 09/25/2015   • Pancreatic pseudocyst (HCC)    • Enlarged thoracic aorta (HCC)    • Hernia of abdominal wall    • GERD (gastroesophageal reflux disease) 10/01/2013       Past Medical History:   • Back problem   • Borderline diabetes    Dx around age 60s   • Cataract   • Chronic bilateral thoracic back pain    Resolved last addressed  10/17/19   • Chronic right shoulder pain    Resolved last addressed  9/13/18   • Depression   • Diabetes (HCC)   • Disorder of thyroid   • DJD (degenerative joint disease), thoracic    CT spine mod djd, osteopenia, ? Lesion at T10   • Enlarged thoracic aorta (HCC)   • GERD (gastroesophageal reflux disease)   • Hearing impairment    tinnitus, left ear   • Hernia of abdominal wall   • High blood pressure   • High cholesterol   • Hx of motion sickness    airplane   • Hyperlipidemia LDL goal <100   • Hypertension, essential, benign   • Hypothyroidism    Dx at age 50s   • Osteoarthritis   • Osteoporosis   • Pancreatic pseudocyst (HCC)    us last year, mri at [facility]   • Pituitary abnormality  (HCC)    Pt says she was told she had pituitary abnormality ?empty sella?   • Rhinitis    Resolved last addressed  14   • Snoring    Resolved last addressed  18   • Visual impairment    eyeglasses       Past Surgical History:   Procedure Laterality Date   • Colonoscopy  ?   • Colonoscopy  2017   •       x 3       Medications Prior to Admission   Medication Sig Dispense Refill Last Dose   • atorvastatin 40 MG Oral Tab Take 1 tablet (40 mg total) by mouth daily.      • carvedilol 3.125 MG Oral Tab Take 1 tablet (3.125 mg total) by mouth 2 (two) times daily.      • ibuprofen 400 MG Oral Tab Take 1 tablet (400 mg total) by mouth 2 (two) times daily as needed.      • Nystatin 944410 UNIT/GM External Powder Apply 1 Application topically 2 (two) times daily.      • ketoconazole 2 % External Cream Apply to affected area daily x 2 weeks PRN 60 g 0    • omeprazole 20 MG Oral Capsule Delayed Release Take 1 capsule (20 mg total) by mouth every morning. Before meal 90 capsule 1    • meclizine 25 MG Oral Tab Take 1 tablet (25 mg total) by mouth 3 (three) times daily as needed for Dizziness. 30 tablet 0    • FLUTICASONE PROPIONATE 50 MCG/ACT Nasal Suspension USE 2 SPRAYS NASALLY EVERY DAY (Patient taking differently: 1 spray by Nasal route daily as needed for Rhinitis or Allergies.) 48 g 2    • METFORMIN 500 MG Oral Tab TAKE 1 TABLET EVERY DAY (Patient taking differently: Take 1 tablet (500 mg total) by mouth every evening.) 90 tablet 1    • LEVOTHYROXINE 75 MCG Oral Tab TAKE 1 TABLET EVERY DAY 90 tablet 1    • LOSARTAN 50 MG Oral Tab TAKE 1 TABLET TWICE DAILY (Patient taking differently: Take 1 tablet (50 mg total) by mouth nightly.) 180 tablet 1    • amLODIPine Besylate 2.5 MG Oral Tab Take 1 tablet (2.5 mg total) by mouth 2 (two) times daily. (Patient taking differently: Take 1 tablet (2.5 mg total) by mouth at bedtime.) 180 tablet 3    • Melatonin 2.5 MG Oral Cap Take 1 capsule (2.5 mg total) by mouth at  bedtime.      • Cholecalciferol (VITAMIN D3) 5000 units Oral Tab Take 1 tablet (5,000 Units total) by mouth daily.      • multivitamin Oral Tab Take 1 tablet by mouth daily.      • empagliflozin 10 MG Oral Tab Take 1 tablet (10 mg total) by mouth daily.   More than a month   • GABAPENTIN 100 MG Oral Cap TAKE 1 CAPSULE EVERY NIGHT 90 capsule 0    • ACCU-CHEK ZARINA PLUS In Vitro Strip TEST BLOOD GLUCOSE EVERY  strip 1    • BACLOFEN 10 MG Oral Tab TAKE 1 TABLET (10 MG TOTAL) BY MOUTH 3 TIMES DAILY. AVOID ALCOHOL, OTHER SEDATIVES OR DRIVING 30 tablet 0    • Accu-Chek Softclix Lancets Does not apply Misc Test daily 100 each 3    • Alcohol Swabs (BD SWAB SINGLE USE REGULAR) Does not apply Pads Test daily 100 each 3    • Blood Glucose Monitoring Suppl (ACCU-CHEK ZARINA PLUS) w/Device Does not apply Kit       • Accu-Chek FastClix Lancets Does not apply Misc Check sugars daily 100 each 3    • Blood Glucose Monitoring Suppl (ACCU-CHEK ZARINA) Does not apply Device 1 Device by Does not apply route daily. 1 Device 0      Current Facility-Administered Medications Ordered in Epic   Medication Dose Route Frequency Provider Last Rate Last Admin   • lactated ringers infusion   Intravenous Continuous Fam Williamson MD       • acetaminophen (Tylenol Extra Strength) tab 1,000 mg  1,000 mg Oral Once Fam Williamson MD       • metoprolol tartrate (Lopressor) tab 25 mg  25 mg Oral Once PRN Fam Williamson MD       • ceFAZolin (Ancef) 2 g in 20mL IV syringe premix  2 g Intravenous Once Fam Williamson MD         No current [facility]-ordered outpatient medications on file.       No Known Allergies    Family History   Problem Relation Age of Onset   • Diabetes Father         Type 2 DM on insulin   • Heart Attack Father    • Diabetes Sister         One sister with DM   • Diabetes Brother         One brother with DM   • Breast Cancer Neg    • Cancer Neg    • Thyroid disease Neg    • Thyroid Disorder Neg      Social History      Socioeconomic History   • Marital status:    • Number of children: 3   Tobacco Use   • Smoking status: Never   • Smokeless tobacco: Never   Substance and Sexual Activity   • Alcohol use: No   • Drug use: No       Available pre-op labs reviewed.             Vital Signs:  Body mass index is 25.75 kg/m².   height is 1.448 m (4' 9\") and weight is 54 kg (119 lb). Her oral temperature is 98 °F (36.7 °C). Her blood pressure is 124/67 and her pulse is 75. Her respiration is 20 and oxygen saturation is 95%.   Vitals:    04/29/24 1410 05/01/24 0729   BP:  124/67   Pulse:  75   Resp:  20   Temp:  98 °F (36.7 °C)   TempSrc:  Oral   SpO2:  95%   Weight: 55.3 kg (122 lb) 54 kg (119 lb)   Height: 1.448 m (4' 9\") 1.448 m (4' 9\")        Anesthesia Evaluation     Patient summary reviewed and Nursing notes reviewed    Airway   Mallampati: II  TM distance: >3 FB  Neck ROM: full  Dental - Dentition appears grossly intact     Comment: poor    Pulmonary - negative ROS and normal exam   Cardiovascular - normal exam  (+) hypertension    ROS comment: Denies chest pain sob    Neuro/Psych    (+)   depression      GI/Hepatic/Renal    (+) GERD    Endo/Other    (+) diabetes mellitus  Abdominal  - normal exam               Anesthesia Plan:   ASA:  3  Plan:   General  Airway:  ETT  Post-op Pain Management: IV analgesics  Informed Consent Plan and Risks Discussed With:  Patient  Use of Blood Products Discussed With:  Patient    I have informed Eliseo Swartz and/or legal guardian or family member of the nature of the anesthetic plan, benefits, risks including possible dental damage if relevant, major complications, and any alternative forms of anesthetic management.   All of the patient's questions were answered to the best of my ability. The patient desires the anesthetic management as planned.  CARIDAD AHMADI MD  5/1/2024 7:33 AM  Present on Admission:  **None**

## 2024-05-01 NOTE — H&P
[facility] Hospitalist H&P       CC: No chief complaint on file.       PCP: Bria Beard MD    History of Present Illness: Patient is a 78 year old female with PMH type 2 diabetes well-controlled, hypothyroidism, GERD, hypertension, hyperlipidemia who presents for ventral hernia repair which was more involved than typical ended up with admission for observation after procedure for pain control and advancing diet slowly  Patient currently admits to 6 out of 10 surgical site pain without nausea or vomiting denies shortness of breath chest pain lower extremity edema vision changes or headache, anxious about going home due to ongoing pain    PMH  Past Medical History:    Back problem    Borderline diabetes    Dx around age 60s    Cataract    Chronic bilateral thoracic back pain    Resolved last addressed  10/17/19    Chronic right shoulder pain    Resolved last addressed  18    Depression    Diabetes ([facility])    Disorder of thyroid    DJD (degenerative joint disease), thoracic    CT spine mod djd, osteopenia, ? Lesion at T10    Enlarged thoracic aorta ([facility])    GERD (gastroesophageal reflux disease)    Hearing impairment    tinnitus, left ear    Hernia of abdominal wall    High blood pressure    High cholesterol    Hx of motion sickness    airplane    Hyperlipidemia LDL goal <100    Hypertension, essential, benign    Hypothyroidism    Dx at age 50s    Osteoarthritis    Osteoporosis    Pancreatic pseudocyst ([facility])    us last year, mri at [facility]    Pituitary abnormality ([facility])    Pt says she was told she had pituitary abnormality ?empty sella?    Rhinitis    Resolved last addressed  14    Snoring    Resolved last addressed  18    Visual impairment    eyeglasses        PSH  Past Surgical History:   Procedure Laterality Date    Colonoscopy  ?    Colonoscopy  2017          x 3        ALL:  No Known Allergies     Home Medications:  Outpatient Medications Marked as Taking for the 24  encounter (Hospital Encounter)   Medication Sig Dispense Refill    traMADol 50 MG Oral Tab Take 1 tablet (50 mg total) by mouth every 6 (six) hours as needed for Pain. 15 tablet 0    docusate sodium (COLACE) 100 MG Oral Cap Take 1 capsule (100 mg total) by mouth 2 (two) times daily for 7 days. 14 capsule 0    atorvastatin 40 MG Oral Tab Take 1 tablet (40 mg total) by mouth daily.      carvedilol 3.125 MG Oral Tab Take 1 tablet (3.125 mg total) by mouth 2 (two) times daily.      empagliflozin 10 MG Oral Tab Take 1 tablet (10 mg total) by mouth daily.      Nystatin 560371 UNIT/GM External Powder Apply 1 Application topically 2 (two) times daily.      ketoconazole 2 % External Cream Apply to affected area daily x 2 weeks PRN 60 g 0    omeprazole 20 MG Oral Capsule Delayed Release Take 1 capsule (20 mg total) by mouth every morning. Before meal 90 capsule 1    FLUTICASONE PROPIONATE 50 MCG/ACT Nasal Suspension USE 2 SPRAYS NASALLY EVERY DAY (Patient taking differently: 1 spray by Nasal route daily as needed for Rhinitis or Allergies.) 48 g 2    ACCU-CHEK ZARINA PLUS In Vitro Strip TEST BLOOD GLUCOSE EVERY  strip 1    METFORMIN 500 MG Oral Tab TAKE 1 TABLET EVERY DAY (Patient taking differently: Take 1 tablet (500 mg total) by mouth every evening.) 90 tablet 1    LEVOTHYROXINE 75 MCG Oral Tab TAKE 1 TABLET EVERY DAY 90 tablet 1    LOSARTAN 50 MG Oral Tab TAKE 1 TABLET TWICE DAILY (Patient taking differently: Take 1 tablet (50 mg total) by mouth nightly.) 180 tablet 1    Accu-Chek Softclix Lancets Does not apply Misc Test daily 100 each 3    Alcohol Swabs (BD SWAB SINGLE USE REGULAR) Does not apply Pads Test daily 100 each 3    Blood Glucose Monitoring Suppl (ACCU-CHEK ZARINA PLUS) w/Device Does not apply Kit       Accu-Chek FastClix Lancets Does not apply Misc Check sugars daily 100 each 3    amLODIPine Besylate 2.5 MG Oral Tab Take 1 tablet (2.5 mg total) by mouth 2 (two) times daily. (Patient taking differently:  Take 1 tablet (2.5 mg total) by mouth at bedtime.) 180 tablet 3    Blood Glucose Monitoring Suppl (ACCU-CHEK ZARINA) Does not apply Device 1 Device by Does not apply route daily. 1 Device 0    Melatonin 2.5 MG Oral Cap Take 1 capsule (2.5 mg total) by mouth at bedtime.      Cholecalciferol (VITAMIN D3) 5000 units Oral Tab Take 1 tablet (5,000 Units total) by mouth daily.      multivitamin Oral Tab Take 1 tablet by mouth daily.           Soc Hx  Social History     Tobacco Use    Smoking status: Never    Smokeless tobacco: Never   Substance Use Topics    Alcohol use: No        Fam Hx  Family History   Problem Relation Age of Onset    Diabetes Father         Type 2 DM on insulin    Heart Attack Father     Diabetes Sister         One sister with DM    Diabetes Brother         One brother with DM    Breast Cancer Neg     Cancer Neg     Thyroid disease Neg     Thyroid Disorder Neg        Review of Systems  Comprehensive ROS reviewed and negative except for what's stated above.     OBJECTIVE:  /64 (BP Location: Right arm)   Pulse 60   Temp 97.9 °F (36.6 °C) (Temporal)   Resp 14   Ht 4' 9\" (1.448 m)   Wt 119 lb (54 kg)   LMP  (LMP Unknown)   SpO2 98%   BMI 25.75 kg/m²   General:  Alert, no distress   Head:  Normocephalic   Eyes:  Sclera anicteric   Nose: Nares normal. Septum midline   Throat: Lips, mucosa, and tongue normal   Neck: Supple, symmetrical, trachea midline   Lungs:   Clear to auscultation bilaterally. Normal effort   Chest wall:  No tenderness or deformity.   Heart:  Regular rate and rhythm, S1, S2 normal   Abdomen:   Surgical bandage in place clean dry and intact abdominal binder in place reduced bowel sounds slightly tender at surgical site   Extremities: Extremities normal, atraumatic.   Skin: Skin color, texture, turgor normal.    Neurologic: Normal strength, no focal deficit appreciated     Diagnostic Data:    CBC/Chem  No results for input(s): \"WBC\", \"HGB\", \"MCV\", \"PLT\", \"BAND\", \"INR\" in the  last 168 hours.    Invalid input(s): \"LYM#\", \"MONO#\", \"BASOS#\", \"EOSIN#\"    No results for input(s): \"NA\", \"K\", \"CL\", \"CO2\", \"BUN\", \"CREATSERUM\", \"GLU\", \"CA\", \"CAION\", \"MG\", \"PHOS\" in the last 168 hours.    No results for input(s): \"ALT\", \"AST\", \"ALB\", \"AMYLASE\", \"LIPASE\", \"LDH\" in the last 168 hours.    Invalid input(s): \"ALPHOS\", \"TBIL\", \"DBIL\", \"TPROT\"    No results for input(s): \"TROP\" in the last 168 hours.      Radiology: No results found.      ASSESSMENT / PLAN:     Patient is a 78 year old female with PMH type 2 diabetes well-controlled, hypothyroidism, GERD, hypertension, hyperlipidemia who presents for ventral hernia repair which was more involved than typical ended up with admission for observation after procedure for pain control.      Ventral hernia repair admitted for observation and pain control  -PRN  Pain medicines limit narcotics per request of son  -Diet per general surgery  -Tylenol or tramadol as needed for pain bowel regimen as needed  -Perioperative antibiotics per surgical service    2.  Essential hypertension continue home amlodipine and carvedilol losartan    3.  Hypothyroidism continue home Synthroid    4.  Type 2 diabetes well-controlled  Hold home oral medications correction factor insulin while inpatient    5.  Insomnia continue home melatonin    6.  GERD continue home PPI    FN:  - IVF: Reduce IV fluid rate to 75 given small stature of patient consider stopping once eating well  - Diet: General diet advance diet per surgery    DVT Prophy: Heparin 3 times daily  Lines: Peripheral IV    FULL Code confirmed on admission with patient and son    Dispo: pending clinical course    Outpatient records or previous hospital records reviewed.     Further recommendations pending patient's clinical course.  [facility] hospitalist to continue to follow patient while in house    Patient and/or patient's family given opportunity to ask questions and note understanding and agreeing with therapeutic plan as  outlined    Thank You,  Fadi Norris DO    Hospitalist with [facility]  Answering Service number: [phone]

## 2024-05-01 NOTE — ANESTHESIA PROCEDURE NOTES
Airway  Date/Time: 5/1/2024 8:46 AM  Urgency: Elective    Airway not difficult    General Information and Staff    Patient location during procedure: OR  Anesthesiologist: Claudio Garner MD  Performed: anesthesiologist   Performed by: Claudio Garner MD  Authorized by: Claudio Garner MD      Indications and Patient Condition  Indications for airway management: anesthesia  Sedation level: deep  Preoxygenated: yes  Patient position: sniffing  Mask difficulty assessment: 1 - vent by mask    Final Airway Details  Final airway type: endotracheal airway      Successful airway: ETT  Cuffed: yes   Successful intubation technique: Video laryngoscopy  Endotracheal tube insertion site: oral  Blade: GlideScope  Blade size: #3  ETT size (mm): 7.0    Cormack-Lehane Classification: grade I - full view of glottis  Placement verified by: capnometry   Measured from: gums  ETT to gums (cm): 20  Number of attempts at approach: 1    Additional Comments  Easy atx x 1 with glidescope

## 2024-05-01 NOTE — TREATMENT PLAN
Received patient from PACU. Alert and oriented. Vitals stable. Tylenol, motrin and tramadol for pain control. Lap sites intact with binder in place. Voiding freely. ACHS and when son requests. Covered per sliding scale. Ambulating with walker. Heparin for DVT prevention. Ancef for post op antibiotic coverage. Family at bedside. Calls appropriately. All needs met at this time.

## 2024-05-02 VITALS
OXYGEN SATURATION: 95 % | TEMPERATURE: 98 F | SYSTOLIC BLOOD PRESSURE: 109 MMHG | WEIGHT: 119 LBS | DIASTOLIC BLOOD PRESSURE: 60 MMHG | RESPIRATION RATE: 18 BRPM | BODY MASS INDEX: 25.67 KG/M2 | HEART RATE: 67 BPM | HEIGHT: 57 IN

## 2024-05-02 LAB
ANION GAP SERPL CALC-SCNC: 5 MMOL/L (ref 0–18)
BUN BLD-MCNC: 11 MG/DL (ref 9–23)
BUN/CREAT SERPL: 17.2 (ref 10–20)
CALCIUM BLD-MCNC: 8.6 MG/DL (ref 8.7–10.4)
CHLORIDE SERPL-SCNC: 104 MMOL/L (ref 98–112)
CO2 SERPL-SCNC: 25 MMOL/L (ref 21–32)
CREAT BLD-MCNC: 0.64 MG/DL
DEPRECATED RDW RBC AUTO: 38.8 FL (ref 35.1–46.3)
EGFRCR SERPLBLD CKD-EPI 2021: 90 ML/MIN/1.73M2 (ref 60–?)
ERYTHROCYTE [DISTWIDTH] IN BLOOD BY AUTOMATED COUNT: 12.9 % (ref 11–15)
EST. AVERAGE GLUCOSE BLD GHB EST-MCNC: 128 MG/DL (ref 68–126)
GLUCOSE BLD-MCNC: 131 MG/DL (ref 70–99)
GLUCOSE BLDC GLUCOMTR-MCNC: 111 MG/DL (ref 70–99)
GLUCOSE BLDC GLUCOMTR-MCNC: 148 MG/DL (ref 70–99)
HBA1C MFR BLD: 6.1 % (ref ?–5.7)
HCT VFR BLD AUTO: 34 %
HGB BLD-MCNC: 11.4 G/DL
MCH RBC QN AUTO: 27.7 PG (ref 26–34)
MCHC RBC AUTO-ENTMCNC: 33.5 G/DL (ref 31–37)
MCV RBC AUTO: 82.5 FL
OSMOLALITY SERPL CALC.SUM OF ELEC: 279 MOSM/KG (ref 275–295)
PLATELET # BLD AUTO: 235 10(3)UL (ref 150–450)
POTASSIUM SERPL-SCNC: 3.9 MMOL/L (ref 3.5–5.1)
RBC # BLD AUTO: 4.12 X10(6)UL
SODIUM SERPL-SCNC: 134 MMOL/L (ref 136–145)
WBC # BLD AUTO: 9.8 X10(3) UL (ref 4–11)

## 2024-05-02 PROCEDURE — 82962 GLUCOSE BLOOD TEST: CPT

## 2024-05-02 PROCEDURE — 80048 BASIC METABOLIC PNL TOTAL CA: CPT

## 2024-05-02 PROCEDURE — 83036 HEMOGLOBIN GLYCOSYLATED A1C: CPT | Performed by: SURGERY

## 2024-05-02 PROCEDURE — 85027 COMPLETE CBC AUTOMATED: CPT

## 2024-05-02 NOTE — PLAN OF CARE
Patient is alert and oriented. Room air. Vital signs stable. Tolerating general diet. ACHS accuchecks. Pain managed with scheduled tylenol and prn motrin. Up with SB and a walker. Voiding. Surgical dressing intact with abdominal binder in place. IV abx continued. Call light and personal belongings within reach. Safety precautions in place.     Problem: Patient Centered Care  Goal: Patient preferences are identified and integrated in the patient's plan of care  Description: Interventions:  - Provide timely, complete, and accurate information to patient/family  - Incorporate patient and family knowledge, values, beliefs, and cultural backgrounds into the planning and delivery of care  - Encourage patient/family to participate in care and decision-making at the level they choose  - Honor patient and family perspectives and choices  Outcome: Progressing     Problem: Diabetes/Glucose Control  Goal: Glucose maintained within prescribed range  Description: INTERVENTIONS:  - Monitor Blood Glucose as ordered  - Assess for signs and symptoms of hyperglycemia and hypoglycemia  - Administer ordered medications to maintain glucose within target range  - Assess barriers to adequate nutritional intake and initiate nutrition consult as needed  - Instruct patient on self management of diabetes  Outcome: Progressing     Problem: GASTROINTESTINAL - ADULT  Goal: Maintains or returns to baseline bowel function  Description: INTERVENTIONS:  - Assess bowel function  - Maintain adequate hydration with IV or PO as ordered and tolerated  - Evaluate effectiveness of GI medications  - Encourage mobilization and activity  - Obtain nutritional consult as needed  - Establish a toileting routine/schedule  - Consider collaborating with pharmacy to review patient's medication profile  Outcome: Progressing     Problem: METABOLIC/FLUID AND ELECTROLYTES - ADULT  Goal: Glucose maintained within prescribed range  Description: INTERVENTIONS:  - Monitor  Blood Glucose as ordered  - Assess for signs and symptoms of hyperglycemia and hypoglycemia  - Administer ordered medications to maintain glucose within target range  - Assess barriers to adequate nutritional intake and initiate nutrition consult as needed  - Instruct patient on self management of diabetes  Outcome: Progressing     Problem: SKIN/TISSUE INTEGRITY - ADULT  Goal: Incision(s), wounds(s) or drain site(s) healing without S/S of infection  Description: INTERVENTIONS:  - Assess and document risk factors for pressure ulcer development  - Assess and document skin integrity  - Assess and document dressing/incision, wound bed, drain sites and surrounding tissue  - Implement wound care per orders  - Initiate isolation precautions as appropriate  - Initiate Pressure Ulcer prevention bundle as indicated  Outcome: Progressing

## 2024-05-02 NOTE — PROGRESS NOTES
[facility]  part of [facility]    General Surgery Progress Note  Eliseo Swartz  CSN:[id number]  HD# [id number]       Subjective:   Complains of incisional pain as expected and denies N/V, unaccompanied.  Passing flatus, no BM     Exam:     General: awake and alert, in no acute distress, comfortable, and in good spirits  Pulmonary:lungs clear to auscultation bilaterally  Cardiac: RRR  Abdomen: normal BS, nondistended, and appropriate incisional tenderness . Dressings are clean, dry, and intact without evidence of erythema or infection.     Extremities: SCD's on, motor intact, and sensory intact    Assessment and Plan:   <principal problem not specified>   POD1 s/p ventral hernia repair    Patient doing well, with appropriate incisional tenderness.  Has ambulated and voiding freely.  Discussed surgical findings and expectations of recovery.    Plan  Diet: Regular  No further antibiotics required.  Saline lock IV  Ambulation   Pulmonary hygiene.    Patient okay to discharge from surgical perspective with okay with rest of medical team.  Instructions given and placed in chart.  F/u with Dr. Williamson x2wk    D/w nurse and Dr. Williamson    Objective:     Vitals:    05/01/24 1251 05/01/24 2100 05/02/24 0049 05/02/24 0447   BP:  117/68 109/57 103/58   Pulse:  69 63 64   Resp:  16 16 16   Temp:  97.9 °F (36.6 °C)  98.7 °F (37.1 °C)   TempSrc:  Oral  Temporal   SpO2:  95% 94% 96%   Weight: 119 lb (54 kg)      Height:         Body mass index is 25.75 kg/m².       Intake/Output Summary (Last 24 hours) at 5/2/2024 0726  Last data filed at 5/2/2024 0047  Gross per 24 hour   Intake 40 ml   Output 800 ml   Net -760 ml       No results found.    Results:     Recent Labs   Lab 05/02/24  0507   RBC 4.12   HGB 11.4*   HCT 34.0*   MCV 82.5   MCH 27.7   MCHC 33.5   RDW 12.9   WBC 9.8   .0       Recent Labs   Lab 05/02/24  0507   *   BUN 11   CREATSERUM 0.64   CA 8.6*   *   K 3.9      CO2 25.0        Lab Results   Component Value Date    WBC 9.8 05/02/2024    HGB 11.4 05/02/2024    HCT 34.0 05/02/2024    .0 05/02/2024     05/02/2024    K 3.9 05/02/2024     05/02/2024    CO2 25.0 05/02/2024    BUN 11 05/02/2024    CREATSERUM 0.64 05/02/2024     05/02/2024    CA 8.6 05/02/2024        At all points during my encounter with the patient my collaborating physician Dr. Fam Williamson  was available to answer questions and update the plan as necessary. The plan as stated previously is in agreement with the team.       Robert Fagan PA-C  General Surgery  [facility]  05/02/24  7:26 AM    The patient was seen and examined by myself.  The above note was modified accordingly    Fam Williamson MD [facility]  05/02/24  1:56 PM

## 2024-05-02 NOTE — DISCHARGE SUMMARY
General Medicine Discharge Summary     Patient ID:  Eliseo Swartz  78 year old  7/15/1945    Admit date: 5/1/2024    Discharge date and time: 05/02/24    Attending Physician: Aleah Somers DO     Consults: IP CONSULT TO HOSPITALIST  IP CONSULT TO SOCIAL WORK    Primary Care Physician: Bria Beard MD     Reason for admission: ventral hernia repair    Risk of Readmission Lace+ Score: 34  59-90 High Risk  29-58 Medium Risk  0-28   Low Risk    Discharge Diagnoses: Ventral hernia  Ventral hernia with obstruction and without gangrene  See Additional Discharge Diagnoses in Hospital Course    Discharged Condition: good    Follow-up with labs/images appointments:   F/u with general surgery in 2 weeks    Exam  Gen: No acute distress  Pulm: Lungs clear, normal respiratory effort  CV: Heart with regular rate and rhythm  Abd: Abdomen soft,   EXT: no edema     HPI: Patient is a 78 year old female with PMH type 2 diabetes well-controlled, hypothyroidism, GERD, hypertension, hyperlipidemia who presents for ventral hernia repair which was more involved than typical ended up with admission for observation after procedure for pain control and advancing diet slowly  Patient currently admits to 6 out of 10 surgical site pain without nausea or vomiting denies shortness of breath chest pain lower extremity edema vision changes or headache, anxious about going home due to ongoing pain    Hospital Course:   Patient is a 78 year old female with PMH type 2 diabetes well-controlled, hypothyroidism, GERD, hypertension, hyperlipidemia who presents for ventral hernia repair which was more involved than typical ended up with admission for observation after procedure for pain control. Discharged home the following day     Ventral hernia repair admitted for observation and pain control  -PRN  Pain medicines limit narcotics per request of son  -Diet per general surgery  -Tylenol or tramadol as needed for pain bowel regimen as  needed  -Perioperative antibiotics per surgical service     2.  Essential hypertension continue home amlodipine and carvedilol losartan     3.  Hypothyroidism continue home Synthroid     4.  Type 2 diabetes well-controlled  Hold home oral medications correction factor insulin while inpatient     5.  Insomnia continue home melatonin     6.  GERD continue home PPI    Operative Procedures: Procedure(s) (LRB):  Ventral hernia repair with mesh, bilateral posterior component seperation (N/A)     Imaging: No results found.      Disposition: home    Activity: activity as tolerated  Diet: regular diet  Wound Care: as directed  Code Status: Full Code  O2:     Home Medication Changes:     Med list     Medication List        START taking these medications      docusate sodium 100 MG Caps  Commonly known as: Colace  Take 1 capsule (100 mg total) by mouth 2 (two) times daily for 7 days.     traMADol 50 MG Tabs  Commonly known as: Ultram  Take 1 tablet (50 mg total) by mouth every 6 (six) hours as needed for Pain.            CHANGE how you take these medications      fluticasone propionate 50 MCG/ACT Susp  Commonly known as: Flonase  USE 2 SPRAYS NASALLY EVERY DAY  What changed:   how much to take  when to take this  reasons to take this     losartan 50 MG Tabs  Commonly known as: Cozaar  TAKE 1 TABLET TWICE DAILY  What changed: when to take this     metFORMIN 500 MG Tabs  Commonly known as: Glucophage  TAKE 1 TABLET EVERY DAY  What changed: when to take this            CONTINUE taking these medications      * Accu-Chek Yolie Avis  1 Device by Does not apply route daily.     * Accu-Chek Yolie Plus w/Device Kit     Accu-Chek Yolie Plus Strp  TEST BLOOD GLUCOSE EVERY DAY     * Accu-Chek FastClix Lancets Misc  Check sugars daily     * Accu-Chek Softclix Lancets Misc  Test daily     amLODIPine 2.5 MG Tabs  Commonly known as: Norvasc  Take 1 tablet (2.5 mg total) by mouth 2 (two) times daily.     atorvastatin 40 MG Tabs  Commonly known  as: Lipitor     BD Swab Single Use Regular Pads  Test daily     carvedilol 3.125 MG Tabs  Commonly known as: Coreg     cholecalciferol 125 MCG (5000 UT) Tabs  Commonly known as: Vitamin D3     empagliflozin 10 MG Tabs  Commonly known as: Jardiance     ibuprofen 400 MG Tabs  Commonly known as: Motrin     ketoconazole 2 % Crea  Commonly known as: Nizoral  Apply to affected area daily x 2 weeks PRN     levothyroxine 75 MCG Tabs  Commonly known as: Synthroid  TAKE 1 TABLET EVERY DAY     meclizine 25 MG Tabs  Commonly known as: Antivert  Take 1 tablet (25 mg total) by mouth 3 (three) times daily as needed for Dizziness.     Melatonin 2.5 MG Caps     multivitamin Tabs     Nystatin 935940 UNIT/GM Powd     omeprazole 20 MG Cpdr  Commonly known as: PriLOSEC  Take 1 capsule (20 mg total) by mouth every morning. Before meal           * This list has 4 medication(s) that are the same as other medications prescribed for you. Read the directions carefully, and ask your doctor or other care provider to review them with you.                   Where to Get Your Medications        These medications were sent to [id number] - [place], [place] - [address] [place] [address] AT [facility] & [address] [place] [address]., [phone], [phone]  [address] [place] [id number]      Phone: [phone]   docusate sodium 100 MG Caps  traMADol 50 MG Tabs         FU   Follow-up Information       Fam Williamson MD Follow up in 2 week(s).    Specialty: SURGERY, GENERAL  Why: Call for follow-up postoperatively  Contact information:  [address] [facility] [address]  [facility] [id number] [phone]               Bria Beard MD Follow up.    Specialty: Internal Medicine  Why: As needed, hospital follow up  Contact information:  [address] [facility] [address] [phone]                             DC instructions:      Other Discharge Instructions:         Dr. Williamson Open Umbilical/Ventral Hernia Repair  Diet:    Eat light foods tonight  and resume normal diet tomorrow.  If you develop nausea, stick to liquids until the nausea goes away.      Activity:    Rest today, avoid heavy lifting greater than 15 lbs, avoid bending or straining for 6 weeks.   No driving for 5 days and until you have stopped taking prescription pain medications.  If you had an open inguinal hernia repair it may be beneficial to wear compression underwear day and night to reduce pain, swelling, and \"fullness\", in the groin.   Bruising in and around the hernia repair is normal after surgery and will resolve over time.      Medications:    You may restart taking  , tonight and all your previous medications tonight at your regular time and dose unless instructed otherwise.      To manage pain you may use the following as a guideline:  Ice 2-3 times a day for 20-30 minute periods.  Tylenol - you may take extra strength Tylenol up to 1000 mg every 8 hours.  Do not exceed 4000 mg of Tylenol in a 24-hour period.  Advil - you may take 2 pills every 6-8 hours with food.  Prescription pain medication - only use for severe breakthrough pain    Please note, prescription pain medication can make you nauseated, bloated and constipated.  A stool softener will be sent to your pharmacy to help avoid constipation.  Examples of severe pain include, unable to sleep due to pain, or waking up in the middle of the night due to pain. Take it with food and discontinue if side effects occur. No alcohol or driving while taking prescription pain medications.     Do not take Aleve or Advil if allergic to them or if allergic to aspirin.      Dressing:   Place ice pack to operative site 3 times a day for the first 48 hours.   You may shower tomorrow with the operative site away from the shower head. Do not submerge your wounds in water (i.e. no baths, swimming, or water aerobics) for 4 weeks.  The tape bandage may be removed in 3-5 days and leave the Steri Strip \"butterfly\" tapes intact until your office  visit. You may continue to shower after that.    All incisions become somewhat hard and sometimes lumpy. That is part of the normal healing process. Bruising around the incisions or lower is also normal and should resolve with time.     Low grade fever up to 101F is normal after surgery. Severe swelling, fever > 101.5, redness or drainage from wound should be reported to your surgeon. Call the office number during and after hours if needed.      Follow-up:        Call the office at [phone].  Make appointment to see Dr. Williamson in approximately 2 weeks.     HOME INSTRUCTIONS  AMBSURG HOME CARE INSTRUCTIONS: POST-OP ANESTHESIA  The medication that you received for sedation or general anesthesia can last up to 24 hours. Your judgment and reflexes may be altered, even if you feel like your normal self.      We Recommend:   Do not drive any motor vehicle or bicycle   Avoid mowing the lawn, playing sports, or working with power tools/applicances (power saws, electric knives or mixers)   That you have someone stay with you on your first night home   Do not drink alcohol or take sleeping pills or tranquilizers   Do not sign legal documents within 24 hours of your procedure   If you had a nerve block for your surgery, take extra care not to put any pressure on your arm or hand for 24 hours    It is normal:  For you to have a sore throat if you had a breathing tube during surgery (while you were asleep!). The sore throat should get better within 48 hours. You can gargle with warm salt water (1/2 tsp in 4 oz warm water) or use a throat lozenge for comfort  To feel muscle aches or soreness especially in the abdomen, chest or neck. The achy feeling should go away in the next 24 hours  To feel weak, sleepy or \"wiped out\". Your should start feeling better in the next 24 hours.   To experience mild discomforts such as sore lip or tongue, headache, cramps, gas pains or a bloated feeling in your abdomen.   To experience mild  back pain or soreness for a day or two if you had spinal or epidural anesthesia.   If you had laparoscopic surgery, to feel shoulder pain or discomfort on the day of surgery.   For some patients to have nausea after surgery/anesthesia    If you feel nausea or experience vomiting:   Try to move around less.   Eat less than usual or drink only liquids until the next morning   Nausea should resolve in about 24 hours    If you have a problem when you are at home:    Call your surgeons office         I reconciled current and discharge medications on day of discharge, discussed changes with patient and noted changes above.       Total Time Coordinating Care: Greater than 30 minutes    Patient had opportunity to ask questions and state understand and agree with therapeutic plan as outlined    Thank You,    Aleah Somers,    Hospitalist with [facility]

## 2024-05-02 NOTE — CM/SW NOTE
05/02/24 1100   Discharge disposition   Expected discharge disposition Home or Self   Discharge transportation [place] Andrés     MDO rec to discuss OOP cost for 23obs.  Met with pt and son at bedside.   Informed both that ins will still pay for her stay it is just billed differently.  Son inquired about DEBRA, Caregivers and HH.  CM explained that pt does not qualify for rehab at this time. Ins will deny as pt is ambulating independently with a walker.  CM explained that Caregiver support is private pay and if arranged through the [facility] it would take a month or more to arrange.   HH can be arranged. Both informed they are there to support care and not to be relied on as a caregiver.     HH declined.   Pt and son requesting [facility] ride home.     Andrés arranged for pickup today at 4pm to take pt home. PCS form completed in Epic, RN to print with AVS. Patient/family notified transport is not covered by insurance. Pt/family are agreeable to the charges.    / to remain available for support and/or discharge planning.   Betty Cary RN, BSN  Nurse   [phone]

## (undated) DEVICE — GAMMEX® PI HYBRID SIZE 8, STERILE POWDER-FREE SURGICAL GLOVE, POLYISOPRENE AND NEOPRENE BLEND: Brand: GAMMEX

## (undated) DEVICE — SYRINGE MED 30ML STD CLR PLAS LL TIP N CTRL

## (undated) DEVICE — VIOLET BRAIDED (POLYGLACTIN 910), SYNTHETIC ABSORBABLE SUTURE: Brand: COATED VICRYL

## (undated) DEVICE — BINDER ABD SM M W12IN CIRC 30-45IN 4 PNL E

## (undated) DEVICE — 3M™ IOBAN™ 2 ANTIMICROBIAL INCISE DRAPE 6650EZ: Brand: IOBAN™ 2

## (undated) DEVICE — MINOR GENERAL: Brand: MEDLINE INDUSTRIES, INC.

## (undated) DEVICE — ENCORE® LATEX MICRO SIZE 8, STERILE LATEX POWDER-FREE SURGICAL GLOVE: Brand: ENCORE

## (undated) DEVICE — 3M™ TEGADERM™ TRANSPARENT FILM DRESSING FRAME STYLE, 1628, 6 IN X 8 IN (15 CM X 20 CM), 10/CT 8CT/CASE: Brand: 3M™ TEGADERM™

## (undated) DEVICE — SUT CHRM GUT 2-0 27IN SH ABSRB UD 26MM 1/2

## (undated) DEVICE — SOLUTION IRRIG 1000ML 0.9% NACL USP BTL

## (undated) DEVICE — 3M™ STERI-STRIP™ REINFORCED ADHESIVE SKIN CLOSURES, R1547, 1/2 IN X 4 IN (12 MM X 100 MM), 6 STRIPS/ENVELOPE: Brand: 3M™ STERI-STRIP™

## (undated) DEVICE — ANTIBACTERIAL UNDYED BRAIDED (POLYGLACTIN 910), SYNTHETIC ABSORBABLE SUTURE: Brand: COATED VICRYL

## (undated) DEVICE — SHEET, T, LAPAROTOMY, STERILE: Brand: MEDLINE

## (undated) DEVICE — SUT PROL 0 30IN CT-1 NABSRB BLU L36MM 1/2 CIR

## (undated) DEVICE — UNDYED BRAIDED (POLYGLACTIN 910), SYNTHETIC ABSORBABLE SUTURE: Brand: COATED VICRYL

## (undated) DEVICE — SUT CHRM GUT 2-0 18IN ABSRB UD TIE CLLGN